# Patient Record
Sex: FEMALE | Race: ASIAN
[De-identification: names, ages, dates, MRNs, and addresses within clinical notes are randomized per-mention and may not be internally consistent; named-entity substitution may affect disease eponyms.]

---

## 2019-01-09 ENCOUNTER — HOSPITAL ENCOUNTER (OUTPATIENT)
Dept: HOSPITAL 91 - GIL | Age: 74
Discharge: HOME | End: 2019-01-09
Payer: COMMERCIAL

## 2019-01-09 ENCOUNTER — HOSPITAL ENCOUNTER (OUTPATIENT)
Dept: HOSPITAL 10 - GIL | Age: 74
Discharge: HOME | End: 2019-01-09
Attending: INTERNAL MEDICINE
Payer: COMMERCIAL

## 2019-01-09 VITALS — RESPIRATION RATE: 20 BRPM | DIASTOLIC BLOOD PRESSURE: 68 MMHG | SYSTOLIC BLOOD PRESSURE: 132 MMHG | HEART RATE: 83 BPM

## 2019-01-09 VITALS — RESPIRATION RATE: 20 BRPM | SYSTOLIC BLOOD PRESSURE: 191 MMHG | DIASTOLIC BLOOD PRESSURE: 78 MMHG | HEART RATE: 96 BPM

## 2019-01-09 VITALS — BODY MASS INDEX: 23.33 KG/M2 | WEIGHT: 118.83 LBS | HEIGHT: 60 IN

## 2019-01-09 DIAGNOSIS — E11.9: ICD-10-CM

## 2019-01-09 DIAGNOSIS — Z12.11: Primary | ICD-10-CM

## 2019-01-09 DIAGNOSIS — K57.30: ICD-10-CM

## 2019-01-09 DIAGNOSIS — K64.8: ICD-10-CM

## 2019-01-09 DIAGNOSIS — I10: ICD-10-CM

## 2019-01-09 DIAGNOSIS — D12.3: ICD-10-CM

## 2019-01-09 PROCEDURE — 88305 TISSUE EXAM BY PATHOLOGIST: CPT

## 2019-01-09 PROCEDURE — 82962 GLUCOSE BLOOD TEST: CPT

## 2019-01-09 PROCEDURE — 45380 COLONOSCOPY AND BIOPSY: CPT

## 2019-01-09 NOTE — PREAC
Date/Time of Note


Date/Time of Note


DATE: 1/9/19 


TIME: 07:43





Anesthesia Eval and Record


Evaluation


Time Pre-Procedure Interview


DATE: 1/9/19 


TIME: 07:43


Age


73


Sex


female


NPO:  8 hrs


Preoperative diagnosis


screening


Planned procedure


colonoscopy





Past Medical History


Past Medical History:  Includes


Cardio:  HTN


Endo:  Diabetes





Surgery & Anesthesia Issues


No known issue





Meds


Anticoagulation:  No


Beta Blocker within 24 hr:  Yes


Reason Beta Blocker not given:  Other (na)


Reported Medications


[Allopurinol]   No Conflict Check


   1/9/19


[Pravastatin]   No Conflict Check


   1/9/19


[Repaglinide]   No Conflict Check


   1/9/19


[Labetalol]   No Conflict Check


   1/9/19


[Amlodipine]   No Conflict Check


   1/9/19


[Losartan]   No Conflict Check


   1/9/19


Meds reviewed:  Yes





Allergies


Coded Allergies:  


     No Known Allergy (Unverified , 1/9/19)


Allergies Reviewed:  Yes





Labs/Studies


Labs Reviewed:  Reviewed by anesthesiologist


Pregnancy test:  N/A





Pre-procedure Exam


Last vitals





Vital Signs


  Date      Temp  Pulse  Resp  B/P (MAP)   Pulse Ox  O2          O2 Flow    FiO2


Time                                                 Delivery    Rate


    1/9/19  97.3     96    20      191/78        98  Room Air


     07:17                          (115)





Airway:  Adequate mouth opening, Adequate thyromental dist


Mallampati:  Mallampati II


Teeth:  Normal


Lung:  Normal


Heart:  Normal





ASA Physical Status


ASA physical status:  2


Emergency:  None





Planned Anesthetic


General/MAC:  MAC





Pre-operative Attestations


Prior to commencing anesthesia and surgery, the patient was re-evaluated, there 


was verification of:


*The patient's identity


*The results of appropriate recent lab work and preoperative vital signs


*The above evaluation not changing prior to induction


*Anesthetic plan, risk benefits, alternative and complications discussed with 


patient/family; questions answered; patient/family understands, accepts and 


wishes to proceed.











TAHMINA LYONS DO              Jan 9, 2019 07:45

## 2019-01-09 NOTE — PAC
Date/Time of Note


Date/Time of Note


DATE: 1/9/19 


TIME: 08:31





Post-Anesthesia Notes


Post-Anesthesia Note


Last documented vital signs





Vital Signs


  Date      Temp  Pulse  Resp  B/P (MAP)   Pulse Ox  O2          O2 Flow    FiO2


Time                                                 Delivery    Rate


    1/9/19  97.3     96    20      191/78        98  Room Air


      0830                          (115)





Activity:  WNL


Respiratory function:  WNL


Cardiovascular function:  WNL


Mental status:  Baseline


Pain reasonably controlled:  Yes


Hydration appropriate:  Yes


Nausea/Vomiting absent:  Yes











TAHMINA LYONS DO              Jan 9, 2019 08:33